# Patient Record
Sex: FEMALE | Race: WHITE | NOT HISPANIC OR LATINO | Employment: FULL TIME | URBAN - METROPOLITAN AREA
[De-identification: names, ages, dates, MRNs, and addresses within clinical notes are randomized per-mention and may not be internally consistent; named-entity substitution may affect disease eponyms.]

---

## 2018-06-22 PROBLEM — M26.629 TMJ SYNDROME: Status: ACTIVE | Noted: 2018-06-22

## 2021-05-06 ENCOUNTER — PATIENT MESSAGE (OUTPATIENT)
Dept: RESEARCH | Facility: HOSPITAL | Age: 66
End: 2021-05-06

## 2022-01-24 PROBLEM — E55.9 VITAMIN D DEFICIENCY: Status: ACTIVE | Noted: 2022-01-24

## 2022-01-24 PROBLEM — J30.1 SEASONAL ALLERGIC RHINITIS DUE TO POLLEN: Status: ACTIVE | Noted: 2022-01-24

## 2022-01-24 PROBLEM — I73.00 RAYNAUD'S DISEASE WITHOUT GANGRENE: Status: ACTIVE | Noted: 2022-01-24

## 2022-01-24 PROBLEM — M15.0 PRIMARY OSTEOARTHRITIS INVOLVING MULTIPLE JOINTS: Status: ACTIVE | Noted: 2022-01-24

## 2022-01-24 PROBLEM — M25.511 ACUTE PAIN OF RIGHT SHOULDER: Status: ACTIVE | Noted: 2022-01-24

## 2022-01-24 PROBLEM — M85.89 OSTEOPENIA OF MULTIPLE SITES: Status: ACTIVE | Noted: 2022-01-24

## 2022-01-24 PROBLEM — M15.9 PRIMARY OSTEOARTHRITIS INVOLVING MULTIPLE JOINTS: Status: ACTIVE | Noted: 2022-01-24

## 2022-05-10 ENCOUNTER — TELEPHONE (OUTPATIENT)
Dept: RHEUMATOLOGY | Facility: CLINIC | Age: 67
End: 2022-05-10

## 2022-05-10 NOTE — TELEPHONE ENCOUNTER
----- Message from Ronald Martinez sent at 5/9/2022  3:24 PM CDT -----  Regarding: appointment  Contact: self  Type:  Sooner Appointment Request    Caller is requesting a sooner appointment.  Caller declined first available appointment listed below.  Caller will not accept being placed on the waitlist and is requesting a message be sent to doctor.    Name of Caller:  self  When is the first available appointment?  na  Symptoms:    Best Call Back Number:  [phone]  Additional Information:  Patient requesting a new pt appointment.  .

## 2022-05-31 ENCOUNTER — PATIENT MESSAGE (OUTPATIENT)
Dept: RHEUMATOLOGY | Facility: CLINIC | Age: 67
End: 2022-05-31

## 2022-05-31 ENCOUNTER — TELEPHONE (OUTPATIENT)
Dept: RHEUMATOLOGY | Facility: CLINIC | Age: 67
End: 2022-05-31

## 2022-05-31 NOTE — TELEPHONE ENCOUNTER
----- Message from Meghan Hauser sent at 5/31/2022  4:42 PM CDT -----  Contact: [phone]  Type: Needs Medical Advice  Who Called:  Pt    Best Call Back Number: [phone]    Additional Information: Pt calling to get a New Pt appt. Pt states dr Berry's office said you were talking new pts. Pls call back and advise    All options to book in Rheumatology are sent through the portal. CG

## 2022-06-02 ENCOUNTER — PATIENT MESSAGE (OUTPATIENT)
Dept: RHEUMATOLOGY | Facility: CLINIC | Age: 67
End: 2022-06-02

## 2023-03-06 ENCOUNTER — OFFICE VISIT (OUTPATIENT)
Dept: RHEUMATOLOGY | Facility: CLINIC | Age: 68
End: 2023-03-06
Payer: MEDICARE

## 2023-03-06 VITALS
WEIGHT: 160.94 LBS | HEIGHT: 68 IN | SYSTOLIC BLOOD PRESSURE: 146 MMHG | HEART RATE: 72 BPM | BODY MASS INDEX: 24.39 KG/M2 | DIASTOLIC BLOOD PRESSURE: 81 MMHG

## 2023-03-06 DIAGNOSIS — M15.9 PRIMARY OSTEOARTHRITIS INVOLVING MULTIPLE JOINTS: ICD-10-CM

## 2023-03-06 DIAGNOSIS — M25.561 PAIN IN JOINT OF RIGHT KNEE: ICD-10-CM

## 2023-03-06 PROCEDURE — 99999 PR PBB SHADOW E&M-EST. PATIENT-LVL IV: ICD-10-PCS | Mod: PBBFAC,,, | Performed by: INTERNAL MEDICINE

## 2023-03-06 PROCEDURE — 99999 PR PBB SHADOW E&M-EST. PATIENT-LVL IV: CPT | Mod: PBBFAC,,, | Performed by: INTERNAL MEDICINE

## 2023-03-06 PROCEDURE — 99205 OFFICE O/P NEW HI 60 MIN: CPT | Mod: S$PBB,,, | Performed by: INTERNAL MEDICINE

## 2023-03-06 PROCEDURE — 99214 OFFICE O/P EST MOD 30 MIN: CPT | Mod: PBBFAC,PN | Performed by: INTERNAL MEDICINE

## 2023-03-06 PROCEDURE — 99205 PR OFFICE/OUTPT VISIT, NEW, LEVL V, 60-74 MIN: ICD-10-PCS | Mod: S$PBB,,, | Performed by: INTERNAL MEDICINE

## 2023-03-06 RX ORDER — MULTIVIT-MIN/FA/LYCOPEN/LUTEIN .4-300-25
TABLET ORAL
COMMUNITY

## 2023-03-06 NOTE — PROGRESS NOTES
Subjective:          Chief Complaint: Nicky Drew is a 67 y.o. female who had concerns including Disease Management.    HPI:    Patient is a 67-year-old female being referred by her primary care physician for the diagnosis of osteoarthritis.  No imaging but she seems to have multiple physical therapy visits involving her knees.  Patient has had OA since her 30s. First presented in cervical spine.   She has hx of arthroscopic surgery in both knees.  Pes planus, with ankle arthritis and sequela tendinopathies  Right hip more recent, some in groin, as well as lateral right hip and stiffness in low back.     She is very active, eats healthy, exercises regularly. She was concerned about autoimmune conditions. dsDNA/RNP antibody negative. She read an article about tetracycline and autoimmune disease.     Patient does appreciate stiffness in the morning, various joints. Takes a few hours this does involve the lumbar spine as well as various peripheral joints. No specific swelling, subtle swelling in various finger joints.     Tried:   NSAIDs-   Ibuprofen- only PRN and never daily for the stiffness.   Aleve- noted this to be beneficial  Tylenol she does use often, joints, less with muscular pain.       No hx of CAD or stenting  No strokes.     She does have Raynauds for many year she has been able to manage this. Was started on amlodipine last year not much different.   REVIEW OF SYSTEMS:    Review of Systems   Constitutional:  Negative for fever.   Eyes:  Positive for redness.   Respiratory:  Negative for cough and shortness of breath.    Cardiovascular:  Negative for chest pain.   Gastrointestinal:  Negative for constipation and diarrhea.   Genitourinary:  Negative for dysuria.   Skin:  Negative for rash.   Neurological:  Positive for headaches.   Endo/Heme/Allergies:  Does not bruise/bleed easily.             Objective:            Past Medical History:   Diagnosis Date    Arthritis     Basal cell carcinoma      Cataract     Genu valgum     left knee    History of shingles      Family History   Problem Relation Age of Onset    Stroke Mother     Alzheimer's disease Mother     Arthritis Mother     Miscarriages / Stillbirths Mother     Heart disease Father     Hypertension Father      Social History     Tobacco Use    Smoking status: Former     Packs/day: 0.25     Years: 10.00     Pack years: 2.50     Types: Cigarettes     Start date: 1973     Quit date: 1983     Years since quittin.2    Smokeless tobacco: Never   Substance Use Topics    Alcohol use: Yes     Alcohol/week: 7.0 standard drinks     Types: 7 Glasses of wine per week    Drug use: Never         Current Outpatient Medications on File Prior to Visit   Medication Sig Dispense Refill    amLODIPine (NORVASC) 5 MG tablet Take 1 tablet (5 mg total) by mouth once daily. 90 tablet 3    cetirizine (ZYRTEC) 10 MG tablet Take 1 tablet (10 mg total) by mouth once daily. 90 tablet 3    multivit-min-FA-lycopen-lutein (CENTRUM SILVER) 0.4 mg-300 mcg- 250 mcg Tab Take by mouth.      alendronate (FOSAMAX) 70 MG tablet Take 1 tablet (70 mg total) by mouth every 7 days. On Hold until 2023 (Patient not taking: Reported on 2/10/2023)       No current facility-administered medications on file prior to visit.       Vitals:    23 0900   BP: (!) 146/81   Pulse: 72       Physical Exam:    Physical Exam  Constitutional:       Appearance: She is well-developed.   HENT:      Head: Normocephalic and atraumatic.   Eyes:      Pupils: Pupils are equal, round, and reactive to light.   Cardiovascular:      Rate and Rhythm: Normal rate and regular rhythm.      Heart sounds: Normal heart sounds.   Pulmonary:      Effort: Pulmonary effort is normal.      Breath sounds: Normal breath sounds.   Musculoskeletal:      Right shoulder: No swelling or tenderness. Normal range of motion.      Left shoulder: No swelling or tenderness. Normal range of motion.      Right elbow: No  swelling. Normal range of motion. No tenderness.      Left elbow: No swelling. Normal range of motion. No tenderness.      Right wrist: No swelling or tenderness. Normal range of motion.      Left wrist: No swelling or tenderness. Normal range of motion.      Right hand: No swelling or tenderness. Normal range of motion.      Left hand: No swelling or tenderness. Normal range of motion.      Cervical back: Normal range of motion.      Right knee: No swelling. Normal range of motion. No tenderness.      Left knee: No swelling. Normal range of motion. No tenderness.      Right foot: Normal range of motion. No swelling or tenderness.      Left foot: Normal range of motion. No swelling or tenderness.   Skin:     General: Skin is warm and dry.   Neurological:      Mental Status: She is alert and oriented to person, place, and time.   Psychiatric:         Behavior: Behavior normal.             Assessment:       Encounter Diagnoses   Name Primary?    Primary osteoarthritis involving multiple joints     Pain in joint of right knee           Plan:        Primary osteoarthritis involving multiple joints  -     Ambulatory referral/consult to Rheumatology    Pain in joint of right knee  -     Ambulatory referral/consult to Rheumatology    Delightful 68 y/o female with Osteoarthritis here for discussion of progression, prognosis, treatment options, regarding OA. Discussed exercise, supplements, NSAID, tylenol and all other treatment modalities we can utilize to manage OA. There is no cure and no medication that will slow progression, but OA can be managed.     I do not feet this is inflammatory arthritis.   Follow up in about 8 months (around 11/6/2023).      60min consultation with greater than 50% of that time included Preparing to see the patient (review records, tests), Obtaining and/or reviewing separately obtained historical data, Performing a medically appropriate examination and/or evaluation , Ordering medications,  tests, and/or procedures, Referring and communicating with other healthcare professionals , Documenting clinical information in the electronic or other health record and Independently interpreting results  (as warranted) & communicating results to the patient/family/caregiver. All questions answered.  Thank you for allowing me to participate in the care of this very pleasant patient.

## 2023-10-10 ENCOUNTER — TELEPHONE (OUTPATIENT)
Dept: DERMATOLOGY | Facility: CLINIC | Age: 68
End: 2023-10-10
Payer: MEDICARE

## 2023-10-10 NOTE — TELEPHONE ENCOUNTER
----- Message from Krystina Zazueta sent at 10/10/2023  1:52 PM CDT -----  Regarding: apt  Contact: [phone]  Pt calling in as np to schedule apt for mole procedure  please call to discus Further      
Informed pt that we have not gotten a path report from her referring provider, Dr. Oliva. But once w get it, Dr. Louis will review and we will be contacting her soon after to schedule. Pt verbalized understanding.  
no

## 2023-10-11 ENCOUNTER — TELEPHONE (OUTPATIENT)
Dept: DERMATOLOGY | Facility: CLINIC | Age: 68
End: 2023-10-11
Payer: MEDICARE

## 2023-10-11 NOTE — TELEPHONE ENCOUNTER
----- Message from Ese Hewitt sent at 10/11/2023  4:43 PM CDT -----  Regarding: appt  Contact: [phone]  Pt returning call from staff in regards to scheduling. Pls call to discuss.

## 2023-10-11 NOTE — TELEPHONE ENCOUNTER
Np left voicemail for pt to call the office in regards to scheduling consult or same day sx for bx proven SCC right posterior proximal arm/shoulder per Dr. Oliva.

## 2023-10-11 NOTE — TELEPHONE ENCOUNTER
NP is now scheduled for same day mohs for SCC on R posterior proximal arm/shoulder. Referral from Dr. Oliva. Over the phone consult completed. Pt confirmed date, time, and location. New patient packet sent in the mail.

## 2023-11-06 ENCOUNTER — OFFICE VISIT (OUTPATIENT)
Dept: RHEUMATOLOGY | Facility: CLINIC | Age: 68
End: 2023-11-06
Payer: MEDICARE

## 2023-11-06 VITALS
HEART RATE: 79 BPM | HEIGHT: 68 IN | SYSTOLIC BLOOD PRESSURE: 135 MMHG | WEIGHT: 160.19 LBS | DIASTOLIC BLOOD PRESSURE: 86 MMHG | BODY MASS INDEX: 24.28 KG/M2

## 2023-11-06 DIAGNOSIS — I73.00 RAYNAUD'S DISEASE WITHOUT GANGRENE: ICD-10-CM

## 2023-11-06 DIAGNOSIS — M15.9 PRIMARY OSTEOARTHRITIS INVOLVING MULTIPLE JOINTS: Primary | ICD-10-CM

## 2023-11-06 PROCEDURE — 99999 PR PBB SHADOW E&M-EST. PATIENT-LVL III: ICD-10-PCS | Mod: PBBFAC,,, | Performed by: INTERNAL MEDICINE

## 2023-11-06 PROCEDURE — 99214 OFFICE O/P EST MOD 30 MIN: CPT | Mod: S$PBB,,, | Performed by: INTERNAL MEDICINE

## 2023-11-06 PROCEDURE — 99214 PR OFFICE/OUTPT VISIT, EST, LEVL IV, 30-39 MIN: ICD-10-PCS | Mod: S$PBB,,, | Performed by: INTERNAL MEDICINE

## 2023-11-06 PROCEDURE — 99999 PR PBB SHADOW E&M-EST. PATIENT-LVL III: CPT | Mod: PBBFAC,,, | Performed by: INTERNAL MEDICINE

## 2023-11-06 PROCEDURE — 99213 OFFICE O/P EST LOW 20 MIN: CPT | Mod: PBBFAC,PN | Performed by: INTERNAL MEDICINE

## 2023-11-06 RX ORDER — TURMERIC 400 MG
CAPSULE ORAL
COMMUNITY

## 2023-11-06 NOTE — PROGRESS NOTES
Subjective:          Chief Complaint: Nicky Drew is a 68 y.o. female who had concerns including Disease Management.    HPI:  Interval events  11/2023:  Patient is here for an 8 month follow-up at our consultation I did not find anything worrisome for an underlying connective tissue disease.  She does have Raynauds phenomenon given rx for amlodipine.   Since our last visit: right GTB and LBP received injection 2/2023 (Dr. Ny) did help greatly, repeat 8/2023 no benefit. She completed PT x 7 sessions with great benefit. Imaging of her hip done at [facility] was negative for clear hip pathology. She is noting the hip continues to give her pain at times through groin. Debate over MRI.     She is having a few days with low back where the stiffness was not improving with activity:   She did start Turmeric with 750mg daily and this did help with her joints. Attempted increasing 1500mg but GI upset. Tylenol arthritis helps some, Ibuprofen 400mg works best.     Rheum Hx:   Patient is a 67-year-old female being referred by her primary care physician for the diagnosis of osteoarthritis.  No imaging but she seems to have multiple physical therapy visits involving her knees.  Patient has had OA since her 30s. First presented in cervical spine.   She has hx of arthroscopic surgery in both knees.  Pes planus, with ankle arthritis and sequela tendinopathies  Right hip more recent, some in groin, as well as lateral right hip and stiffness in low back.     She is very active, eats healthy, exercises regularly. She was concerned about autoimmune conditions. dsDNA/RNP antibody negative. She read an article about tetracycline and autoimmune disease.     Patient does appreciate stiffness in the morning, various joints. Takes a few hours this does involve the lumbar spine as well as various peripheral joints. No specific swelling, subtle swelling in various finger joints.     Tried:   NSAIDs-   Ibuprofen- only PRN and never daily  for the stiffness.   Aleve- noted this to be beneficial  Tylenol she does use often, joints, less with muscular pain.       No hx of CAD or stenting  No strokes.     She does have Raynauds for many year she has been able to manage this. Was started on amlodipine last year not much different.   REVIEW OF SYSTEMS:    Review of Systems   Constitutional:  Negative for fever.   Eyes:  Positive for redness.   Respiratory:  Negative for cough and shortness of breath.    Cardiovascular:  Negative for chest pain.   Gastrointestinal:  Negative for constipation and diarrhea.   Genitourinary:  Negative for dysuria.   Skin:  Negative for rash.   Neurological:  Positive for headaches.   Endo/Heme/Allergies:  Does not bruise/bleed easily.               Objective:            Past Medical History:   Diagnosis Date    Arthritis     Basal cell carcinoma     Cataract     Genu valgum     left knee    History of shingles      Family History   Problem Relation Age of Onset    Stroke Mother     Alzheimer's disease Mother     Arthritis Mother     Miscarriages / Stillbirths Mother     Heart disease Father     Hypertension Father      Social History     Tobacco Use    Smoking status: Former     Current packs/day: 0.00     Average packs/day: 0.3 packs/day for 10.5 years (2.6 ttl pk-yrs)     Types: Cigarettes     Start date: 1973     Quit date: 1983     Years since quittin.9    Smokeless tobacco: Never   Substance Use Topics    Alcohol use: Yes     Alcohol/week: 7.0 standard drinks of alcohol     Types: 7 Glasses of wine per week    Drug use: Never         Current Outpatient Medications on File Prior to Visit   Medication Sig Dispense Refill    amLODIPine (NORVASC) 5 MG tablet Take 1 tablet (5 mg total) by mouth once daily. 90 tablet 3    cetirizine (ZYRTEC) 10 MG tablet Take 1 tablet (10 mg total) by mouth once daily. 90 tablet 3    multivit-min-FA-lycopen-lutein (CENTRUM SILVER) 0.4 mg-300 mcg- 250 mcg Tab Take by mouth.       turmeric 400 mg Cap Take by mouth.      alendronate (FOSAMAX) 70 MG tablet Take 1 tablet (70 mg total) by mouth every 7 days. On Hold until March 2023       No current facility-administered medications on file prior to visit.       Vitals:    11/06/23 1006   BP: 135/86   Pulse: 79       Physical Exam:    Physical Exam  Constitutional:       Appearance: She is well-developed.   HENT:      Head: Normocephalic and atraumatic.   Eyes:      Pupils: Pupils are equal, round, and reactive to light.   Cardiovascular:      Rate and Rhythm: Normal rate and regular rhythm.      Heart sounds: Normal heart sounds.   Pulmonary:      Effort: Pulmonary effort is normal.      Breath sounds: Normal breath sounds.   Musculoskeletal:      Right shoulder: No swelling or tenderness. Normal range of motion.      Left shoulder: No swelling or tenderness. Normal range of motion.      Right elbow: No swelling. Normal range of motion. No tenderness.      Left elbow: No swelling. Normal range of motion. No tenderness.      Right wrist: No swelling or tenderness. Normal range of motion.      Left wrist: No swelling or tenderness. Normal range of motion.      Right hand: No swelling or tenderness. Normal range of motion.      Left hand: No swelling or tenderness. Normal range of motion.      Cervical back: Normal range of motion.      Right knee: No swelling. Normal range of motion. No tenderness.      Left knee: No swelling. Normal range of motion. No tenderness.      Right foot: Normal range of motion. No swelling or tenderness.      Left foot: Normal range of motion. No swelling or tenderness.   Skin:     General: Skin is warm and dry.   Neurological:      Mental Status: She is alert and oriented to person, place, and time.   Psychiatric:         Behavior: Behavior normal.               Assessment:       No diagnosis found.         Plan:        Primary osteoarthritis involving multiple joints    Raynaud's disease without  gangrene      Delightful 68 y/o female with Osteoarthritis here for discussion of progression, prognosis, treatment options, regarding OA. Discussed exercise, supplements, NSAID, tylenol and all other treatment modalities we can utilize to manage OA. There is no cure and no medication that will slow progression, but OA can be managed.     I do not feet this is inflammatory arthritis. Findings continue to be all OA.   Follow up in about 1 year (around 11/6/2024).      30min consultation with greater than 50% of that time included Preparing to see the patient (review records, tests), Obtaining and/or reviewing separately obtained historical data, Performing a medically appropriate examination and/or evaluation , Ordering medications, tests, and/or procedures, Referring and communicating with other healthcare professionals , Documenting clinical information in the electronic or other health record and Independently interpreting results  (as warranted) & communicating results to the patient/family/caregiver. All questions answered.  Thank you for allowing me to participate in the care of this very pleasant patient.

## 2023-11-16 ENCOUNTER — PROCEDURE VISIT (OUTPATIENT)
Dept: DERMATOLOGY | Facility: CLINIC | Age: 68
End: 2023-11-16
Payer: MEDICARE

## 2023-11-16 VITALS
DIASTOLIC BLOOD PRESSURE: 84 MMHG | WEIGHT: 160.25 LBS | BODY MASS INDEX: 24.29 KG/M2 | HEIGHT: 68 IN | HEART RATE: 54 BPM | SYSTOLIC BLOOD PRESSURE: 150 MMHG

## 2023-11-16 DIAGNOSIS — C44.622 SQUAMOUS CELL CARCINOMA OF RIGHT UPPER EXTREMITY: Primary | ICD-10-CM

## 2023-11-16 PROCEDURE — 99499 UNLISTED E&M SERVICE: CPT | Mod: S$PBB,,, | Performed by: DERMATOLOGY

## 2023-11-16 PROCEDURE — 17313 MOHS 1 STAGE T/A/L: CPT | Mod: PBBFAC | Performed by: DERMATOLOGY

## 2023-11-16 PROCEDURE — 13121 PR RECMPL WND SCALP,EXTR 2.6-7.5 CM: ICD-10-PCS | Mod: S$PBB,51,, | Performed by: DERMATOLOGY

## 2023-11-16 PROCEDURE — 17313: ICD-10-PCS | Mod: S$PBB,,, | Performed by: DERMATOLOGY

## 2023-11-16 PROCEDURE — 17313 MOHS 1 STAGE T/A/L: CPT | Mod: S$PBB,,, | Performed by: DERMATOLOGY

## 2023-11-16 PROCEDURE — 13121 CMPLX RPR S/A/L 2.6-7.5 CM: CPT | Mod: S$PBB,51,, | Performed by: DERMATOLOGY

## 2023-11-16 PROCEDURE — 13121 CMPLX RPR S/A/L 2.6-7.5 CM: CPT | Mod: PBBFAC | Performed by: DERMATOLOGY

## 2023-11-16 PROCEDURE — 99499 NO LOS: ICD-10-PCS | Mod: S$PBB,,, | Performed by: DERMATOLOGY

## 2023-11-16 NOTE — PROGRESS NOTES
"New patient with a 6 months h/o growth om the R posterior proximal arm/shoulder. Patient states the lesion has been present since early summer. Denies bleeding, crusting, scabbing. Patient states lesion was "bubbly" and would "slough off". (+) painful to the touch. Biopsy c/w SCC. No prior treatment. (+) h/o NMSCs.    Physical Exam   Skin:              R posterior proximal arm/shoulder with a 9 x 10 mm pink bx site located 5 cm inferiorly from the top of the R shoulder.    Biopsy proven well-differentiated squamous cell carcinoma- R posterior proximal arm/shoulder, path# [id number].  Diagnosis, photograph, and pathology report were reviewed with patient.  Discussed risks, benefits, and alternatives of Mohs surgery.  Discussed repair options including complex closure, skin flap, skin graft, and second intention healing.  Patient agreed to proceed with Mohs surgery today.      PROCEDURE: Mohs' Micrographic Surgery    INDICATION: Tumor with ill-defined borders.    REFERRING PROVIDER: Geovany Oliva M.D.    CASE NUMBER:     ANESTHETIC: 3 cc 0.5% Lidocaine with Epi 1:200,000 mixed 1:1 with 0.5% Bupivacaine    SURGICAL PREP: Hibiclens    SURGEON: Annalee Louis MD    ASSISTANTS: Shavon Lino MA    PREOPERATIVE DIAGNOSIS: squamous cell carcinoma- well differentiated    POSTOPERATIVE DIAGNOSIS: squamous cell carcinoma    PATHOLOGIC DIAGNOSIS: squamous cell carcinoma- well differentiated    HISTOLOGY OF SPECIMENS IN FIRST STAGE:   Tumor Type:  No tumor seen.    STAGES OF MOHS' SURGERY PERFORMED: 1    TUMOR-FREE PLANE ACHIEVED: Yes    HEMOSTASIS: electrocoagulation     SPECIMENS: 2     LOCATION: right posterior proximal arm/shoulder. Location verified with Dr. Oliva's clinical photograph. Patient also verified location by looking at photo taken prior to procedure.     INITIAL LESION SIZE: 0.9 x 1.0 cm    FINAL DEFECT SIZE: 1.2 x 1.6 cm    WOUND REPAIR/DISPOSITION: The patient tolerated Mohs' Micrographic " Surgery for a squamous cell carcinoma very well. When the tumor was completely removed, a repair of the surgical defect was undertaken.    PROCEDURE: Complex Linear Repair    INDICATION: Status post Mohs' Micrographic Surgery for squamous cell carcinoma.    CASE NUMBER:     SURGEON: Annalee Louis MD    ASSISTANTS: Nolvia Shepard PA-C and Shavon Lino MA    ANESTHETIC: 2 cc 1% Lidocaine with Epinephrine 1:200,000    SURGICAL PREP: Hibiclens, prepped by Shavon Lino MA    LOCATION: right posterior proximal arm/shoulder    DEFECT SIZE: 1.2 x 1.6 cm    WOUND REPAIR/DISPOSITION:  After the patient's carcinoma had been completely removed with Mohs' Micrographic Surgery, a repair of the surgical defect was undertaken. The patient was returned to the operating suite where the area of right posterior proximal arm/shoulder was prepped, draped, and anesthetized in the usual sterile fashion. The wound was widely undermined in all directions. The wound was undermined to a distance at least the maximum width of the defect as measured perpendicular to the closure line along at least one entire edge of the defect, in this case 2 cm. Then, electrocoagulation was used to obtain meticulous hemostasis. 4-0 Vicryl buried vertical mattress sutures were placed into the subcutaneous and dermal plane to close the wound and stacey the cutaneous wound edge. Bilateral dog ears were identified and were removed by a standard Burow's triangle technique. The cutaneous wound edges were closed using running 4-0 Prolene suture.    The patient tolerated the procedure well.    The area was cleaned and dressed appropriately and the patient was given wound care instructions, as well as appointment for follow-up evaluation and suture removal in 14 days.    LENGTH OF REPAIR: 2.8 cm    Vitals:    11/16/23 1301 11/16/23 1442   BP: (!) 143/84 (!) 150/84   BP Location: Left arm    Patient Position: Sitting    BP Method: Medium (Automatic)    Pulse: 65  "(!) 54   Weight: 72.7 kg (160 lb 4.4 oz)    Height: 5' 7.99" (1.727 m)          "

## 2023-11-30 ENCOUNTER — OFFICE VISIT (OUTPATIENT)
Dept: DERMATOLOGY | Facility: CLINIC | Age: 68
End: 2023-11-30
Payer: MEDICARE

## 2023-11-30 DIAGNOSIS — Z09 POSTOP CHECK: Primary | ICD-10-CM

## 2023-11-30 PROCEDURE — 99024 PR POST-OP FOLLOW-UP VISIT: ICD-10-PCS | Mod: POP,,, | Performed by: DERMATOLOGY

## 2023-11-30 PROCEDURE — 99212 OFFICE O/P EST SF 10 MIN: CPT | Mod: PBBFAC | Performed by: DERMATOLOGY

## 2023-11-30 PROCEDURE — 99024 POSTOP FOLLOW-UP VISIT: CPT | Mod: POP,,, | Performed by: DERMATOLOGY

## 2023-11-30 PROCEDURE — 99999 PR PBB SHADOW E&M-EST. PATIENT-LVL II: ICD-10-PCS | Mod: PBBFAC,,, | Performed by: DERMATOLOGY

## 2023-11-30 PROCEDURE — 99999 PR PBB SHADOW E&M-EST. PATIENT-LVL II: CPT | Mod: PBBFAC,,, | Performed by: DERMATOLOGY

## 2023-11-30 NOTE — PROGRESS NOTES
68 y.o. female patient is here for suture removal following Mohs' surgery.    Patient reports no problems.    WOUND PE:  The right posterior proximal arm/shoulder sutures intact. Wound healing well. Good skin edges. No signs or symptoms of infection.      IMPRESSION:  Healing operative site from Mohs' surgery SCC right posterior proximal arm/shoulder s/p Mohs with CLC, postop day #14.    PLAN:  Sutures removed today by  Kylah Chiu RN . Steri-strips applied.  Continue wound care.  Keep moist with Aquaphor.  Call if any issues arise    RTC:  In 3-6 months with Geovany Oliva M.D. for skin check or sooner if new concern arises.

## 2024-02-09 PROBLEM — I10 ESSENTIAL HYPERTENSION: Status: ACTIVE | Noted: 2024-02-09

## 2024-03-20 PROBLEM — Z91.89 FRAMINGHAM CARDIAC RISK 10-20% IN NEXT 10 YEARS: Status: ACTIVE | Noted: 2024-03-20

## 2024-08-23 ENCOUNTER — TELEPHONE (OUTPATIENT)
Dept: RHEUMATOLOGY | Facility: CLINIC | Age: 69
End: 2024-08-23
Payer: MEDICARE

## 2024-08-23 NOTE — TELEPHONE ENCOUNTER
----- Message from Trudy Barry sent at 8/23/2024 12:36 PM CDT -----  Contact: Self  Type: Needs Medical Advice    Who Called:  Patient  What is this regarding?:  She was looking to schedule a f/up appt out in November.  Best Call Back Number:  [phone]  Additional Information:  Please call the patient back at the phone number listed above to advise. Thank you!

## 2024-11-18 ENCOUNTER — OFFICE VISIT (OUTPATIENT)
Dept: RHEUMATOLOGY | Facility: CLINIC | Age: 69
End: 2024-11-18
Payer: MEDICARE

## 2024-11-18 ENCOUNTER — LAB VISIT (OUTPATIENT)
Dept: LAB | Facility: HOSPITAL | Age: 69
End: 2024-11-18
Attending: INTERNAL MEDICINE
Payer: MEDICARE

## 2024-11-18 VITALS
HEIGHT: 68 IN | BODY MASS INDEX: 24.99 KG/M2 | HEART RATE: 73 BPM | WEIGHT: 164.88 LBS | DIASTOLIC BLOOD PRESSURE: 82 MMHG | SYSTOLIC BLOOD PRESSURE: 123 MMHG

## 2024-11-18 DIAGNOSIS — M15.0 PRIMARY OSTEOARTHRITIS INVOLVING MULTIPLE JOINTS: Primary | ICD-10-CM

## 2024-11-18 DIAGNOSIS — M15.0 PRIMARY OSTEOARTHRITIS INVOLVING MULTIPLE JOINTS: ICD-10-CM

## 2024-11-18 DIAGNOSIS — I73.00 RAYNAUD'S DISEASE WITHOUT GANGRENE: ICD-10-CM

## 2024-11-18 LAB
CREAT SERPL-MCNC: 0.7 MG/DL (ref 0.5–1.4)
EST. GFR  (NO RACE VARIABLE): >60 ML/MIN/1.73 M^2
RHEUMATOID FACT SERPL-ACNC: <13 IU/ML (ref 0–15)

## 2024-11-18 PROCEDURE — 86256 FLUORESCENT ANTIBODY TITER: CPT | Performed by: INTERNAL MEDICINE

## 2024-11-18 PROCEDURE — 86235 NUCLEAR ANTIGEN ANTIBODY: CPT | Performed by: INTERNAL MEDICINE

## 2024-11-18 PROCEDURE — 86038 ANTINUCLEAR ANTIBODIES: CPT | Performed by: INTERNAL MEDICINE

## 2024-11-18 PROCEDURE — 99214 OFFICE O/P EST MOD 30 MIN: CPT | Mod: S$PBB,,, | Performed by: INTERNAL MEDICINE

## 2024-11-18 PROCEDURE — 86235 NUCLEAR ANTIGEN ANTIBODY: CPT | Mod: 59 | Performed by: INTERNAL MEDICINE

## 2024-11-18 PROCEDURE — 36415 COLL VENOUS BLD VENIPUNCTURE: CPT | Mod: PO | Performed by: INTERNAL MEDICINE

## 2024-11-18 PROCEDURE — 99213 OFFICE O/P EST LOW 20 MIN: CPT | Mod: PBBFAC,PO | Performed by: INTERNAL MEDICINE

## 2024-11-18 PROCEDURE — 99999 PR PBB SHADOW E&M-EST. PATIENT-LVL III: CPT | Mod: PBBFAC,,, | Performed by: INTERNAL MEDICINE

## 2024-11-18 PROCEDURE — 82565 ASSAY OF CREATININE: CPT | Performed by: INTERNAL MEDICINE

## 2024-11-18 PROCEDURE — 86431 RHEUMATOID FACTOR QUANT: CPT | Performed by: INTERNAL MEDICINE

## 2024-11-18 ASSESSMENT — ROUTINE ASSESSMENT OF PATIENT INDEX DATA (RAPID3)
PAIN SCORE: 2
PSYCHOLOGICAL DISTRESS SCORE: 0
MDHAQ FUNCTION SCORE: 0.2
TOTAL RAPID3 SCORE: 1.56
PATIENT GLOBAL ASSESSMENT SCORE: 2
FATIGUE SCORE: 1.1

## 2024-11-18 NOTE — PROGRESS NOTES
Subjective:          Chief Complaint: Nicky Drew is a 69 y.o. female who had concerns including Disease Management.    HPI: OA and Primary Raynuads.   Interval events  11/2024: started new exercise: Access strength targeted for osteoporosis.   She is noting increase in thoracic stiffness using advil about 3 times weekly  Known ankle tendonitits with pes planus the left flared since 8/2024.   Knees aching but managing.   Hip is right GTB. Some antalgic gait. Noted.     Overall she notes her Raynaud's stable     11/2023:  Patient is here for an 8 month follow-up at our consultation I did not find anything worrisome for an underlying connective tissue disease.  She does have Raynauds phenomenon given rx for amlodipine.   Since our last visit: right GTB and LBP received injection 2/2023 (Dr. Ny) did help greatly, repeat 8/2023 no benefit. She completed PT x 7 sessions with great benefit. [place] of [place] [facility] [place] for clear hip pathology. She is noting the hip continues to give her pain at times through groin. Debate over MRI.     She is having a few days with low back where the stiffness was not improving with activity:   She did start Turmeric with 750mg daily and this did help with her joints. Attempted increasing 1500mg but GI upset. Tylenol arthritis helps some, Ibuprofen 400mg works best.     Rheum Hx:   Patient is a 67-year-old female being referred by her primary care physician for the diagnosis of osteoarthritis.  No imaging but she seems to have multiple physical therapy visits involving her knees.  Patient has had OA since her 30s. First presented in cervical spine.   She has hx of arthroscopic surgery in both knees.  Pes planus, with ankle arthritis and sequela tendinopathies  Right hip more recent, some in groin, as well as lateral right hip and stiffness in low back.     She is very active, eats healthy, exercises regularly. She was concerned about autoimmune conditions.  dsDNA/RNP antibody negative. She read an article about tetracycline and autoimmune disease.     Patient does appreciate stiffness in the morning, various joints. Takes a few hours this does involve the lumbar spine as well as various peripheral joints. No specific swelling, subtle swelling in various finger joints.     Tried:   NSAIDs-   Ibuprofen- only PRN and never daily for the stiffness.   Aleve- noted this to be beneficial  Tylenol she does use often, joints, less with muscular pain.       No hx of CAD or stenting  No strokes.     She does have Raynauds for many year she has been able to manage this. Was started on amlodipine last year not much different.   REVIEW OF SYSTEMS:    Review of Systems   Constitutional:  Negative for fever.   Eyes:  Positive for redness.   Respiratory:  Negative for cough and shortness of breath.    Cardiovascular:  Negative for chest pain.   Gastrointestinal:  Negative for constipation and diarrhea.   Genitourinary:  Negative for dysuria.   Skin:  Negative for rash.   Neurological:  Positive for headaches.   Endo/Heme/Allergies:  Does not bruise/bleed easily.               Objective:            Past Medical History:   Diagnosis Date    Arthritis     Basal cell carcinoma     Cataract     Genu valgum     left knee    History of shingles     SCC (squamous cell carcinoma), shoulder 11/16/2023    right posterior proximal arm/shoulder     Family History   Problem Relation Name Age of Onset    Stroke Mother Nisha L Viki     Alzheimer's disease Mother Nisha WINTER Viki     Arthritis Mother Nisha L Viki     Miscarriages / Stillbirths Mother Nisha L Viki     Heart disease Father Wander LIRIANO Viki     Hypertension Father Wander LIRIANO Viki      Social History     Tobacco Use    Smoking status: Former     Current packs/day: 0.00     Average packs/day: 0.3 packs/day for 10.5 years (2.6 ttl pk-yrs)     Types: Cigarettes     Start date: 6/4/1973     Quit date: 12/1/1983     Years since  quittin.9    Smokeless tobacco: Never   Substance Use Topics    Alcohol use: Yes     Alcohol/week: 7.0 standard drinks of alcohol     Types: 7 Glasses of wine per week    Drug use: Never         Current Outpatient Medications on File Prior to Visit   Medication Sig Dispense Refill    amLODIPine (NORVASC) 5 MG tablet Take 1 tablet (5 mg total) by mouth once daily. 90 tablet 3    cetirizine (ZYRTEC) 10 MG tablet Take 1 tablet (10 mg total) by mouth once daily. 90 tablet 3    multivit-min-FA-lycopen-lutein (CENTRUM SILVER) 0.4 mg-300 mcg- 250 mcg Tab Take by mouth.      turmeric 400 mg Cap Take by mouth.       No current facility-administered medications on file prior to visit.       Vitals:    24 1331   BP: 123/82   Pulse: 73       Physical Exam:    Physical Exam  Constitutional:       Appearance: She is well-developed.   HENT:      Head: Normocephalic and atraumatic.   Eyes:      Pupils: Pupils are equal, round, and reactive to light.   Cardiovascular:      Rate and Rhythm: Normal rate and regular rhythm.      Heart sounds: Normal heart sounds.   Pulmonary:      Effort: Pulmonary effort is normal.      Breath sounds: Normal breath sounds.   Musculoskeletal:      Right shoulder: No swelling or tenderness. Normal range of motion.      Left shoulder: No swelling or tenderness. Normal range of motion.      Right elbow: No swelling. Normal range of motion. No tenderness.      Left elbow: No swelling. Normal range of motion. No tenderness.      Right wrist: No swelling or tenderness. Normal range of motion.      Left wrist: No swelling or tenderness. Normal range of motion.      Right hand: No swelling or tenderness. Normal range of motion.      Left hand: No swelling or tenderness. Normal range of motion.      Cervical back: Normal range of motion.      Right knee: No swelling. Normal range of motion. No tenderness.      Left knee: No swelling. Normal range of motion. No tenderness.      Right foot: Normal  range of motion. No swelling or tenderness.      Left foot: Normal range of motion. No swelling or tenderness.   Skin:     General: Skin is warm and dry.   Neurological:      Mental Status: She is alert and oriented to person, place, and time.   Psychiatric:         Behavior: Behavior normal.               Assessment:       Encounter Diagnoses   Name Primary?    Primary osteoarthritis involving multiple joints Yes    Raynaud's disease without gangrene             Plan:        Primary osteoarthritis involving multiple joints  -     CREATININE, SERUM; Future; Expected date: 11/18/2024    Raynaud's disease without gangrene  -     KENDRA Screen w/Reflex; Future; Expected date: 11/18/2024  -     Sjogrens syndrome-A extractable nuclear antibody; Future; Expected date: 11/18/2024  -     Sjogrens syndrome-B extractable nuclear antibody; Future; Expected date: 11/18/2024  -     RHEUMATOID FACTOR; Future; Expected date: 11/18/2024  -     Anti-Centromere Antibody; Future; Expected date: 11/18/2024  -     ANTI-SCLERODERMA ANTIBODY; Future; Expected date: 11/18/2024  -     RNA polymerase III Ab, IgG; Future; Expected date: 11/18/2024        Delightful 70 y/o female with Osteoarthritis here for discussion of progression, prognosis, treatment options, regarding OA. Discussed exercise, supplements, NSAID, tylenol and all other treatment modalities we can utilize to manage OA. There is no cure and no medication that will slow progression, but OA can be managed.     I do not feet this is inflammatory arthritis. Findings continue to be all OA. We are going to expand her serologies for completeness- no sclerodactyly, no telangectasia, no calcinosis or dysphagia.     No follow-ups on file.      30min consultation with greater than 50% of that time included Preparing to see the patient (review records, tests), Obtaining and/or reviewing separately obtained historical data, Performing a medically appropriate examination and/or evaluation ,  Ordering medications, tests, and/or procedures, Referring and communicating with other healthcare professionals , Documenting clinical information in the electronic or other health record and Independently interpreting results  (as warranted) & communicating results to the patient/family/caregiver. All questions answered.  Thank you for allowing me to participate in the care of this very pleasant patient.

## 2024-11-19 LAB — ANA SER QL IF: NORMAL

## 2024-11-20 LAB
ANTI-SSA ANTIBODY: 0.06 RATIO (ref 0–0.99)
ANTI-SSA INTERPRETATION: NEGATIVE
ANTI-SSB ANTIBODY: 0.08 RATIO (ref 0–0.99)
ANTI-SSB INTERPRETATION: NEGATIVE

## 2024-11-21 LAB
ANTI-CENTROMERE ANTIBODY: 0.6 U/ML
ENA SCL70 AB SER-ACNC: <0.6 U/ML

## 2025-03-03 ENCOUNTER — PATIENT MESSAGE (OUTPATIENT)
Dept: OTHER | Facility: OTHER | Age: 70
End: 2025-03-03
Payer: MEDICARE

## 2025-05-05 ENCOUNTER — PATIENT MESSAGE (OUTPATIENT)
Dept: ADMINISTRATIVE | Facility: OTHER | Age: 70
End: 2025-05-05
Payer: MEDICARE

## 2025-07-14 ENCOUNTER — OFFICE VISIT (OUTPATIENT)
Dept: RHEUMATOLOGY | Facility: CLINIC | Age: 70
End: 2025-07-14
Payer: MEDICARE

## 2025-07-14 VITALS
SYSTOLIC BLOOD PRESSURE: 126 MMHG | HEIGHT: 68 IN | HEART RATE: 69 BPM | WEIGHT: 162.06 LBS | DIASTOLIC BLOOD PRESSURE: 76 MMHG | BODY MASS INDEX: 24.56 KG/M2

## 2025-07-14 DIAGNOSIS — I73.00 RAYNAUD'S DISEASE WITHOUT GANGRENE: Primary | ICD-10-CM

## 2025-07-14 DIAGNOSIS — M15.0 PRIMARY OSTEOARTHRITIS INVOLVING MULTIPLE JOINTS: ICD-10-CM

## 2025-07-14 PROCEDURE — 99214 OFFICE O/P EST MOD 30 MIN: CPT | Mod: S$PBB,,, | Performed by: INTERNAL MEDICINE

## 2025-07-14 PROCEDURE — 99999 PR PBB SHADOW E&M-EST. PATIENT-LVL III: CPT | Mod: PBBFAC,,, | Performed by: INTERNAL MEDICINE

## 2025-07-14 PROCEDURE — 99213 OFFICE O/P EST LOW 20 MIN: CPT | Mod: PBBFAC,PO | Performed by: INTERNAL MEDICINE

## 2025-07-14 NOTE — PROGRESS NOTES
Subjective:          Chief Complaint: Nicky Drew is a 69 y.o. female who had concerns including Disease Management.    HPI: OA and Primary Raynuads.   Interval events    7/2025: today 3/10 pain left ankle/foot with PTT since about April working with ortho on this.     Wanted to discuss some flares- she had a party at her house, decorating, baking, and ate 2 slices of cake. She felt heavy and felt like walking through molasses. No specific joint pain. Energy was down. O/w no cough, sob, skin changes, telangectasia.   Raynaud has been good.   We follow her for Raynauds but thus far negative serologies.     11/2024: started new exercise: Access strength targeted for osteoporosis.   She is noting increase in thoracic stiffness using advil about 3 times weekly  Known ankle tendonitits with pes planus the left flared since 8/2024.   Knees aching but managing.   Hip is right GTB. Some antalgic gait. Noted.     Overall she notes her Raynaud's stable     11/2023:  Patient is here for an 8 month follow-up at our consultation I did not find anything worrisome for an underlying connective tissue disease.  She does have Raynauds phenomenon given rx for amlodipine.   Since our last visit: right GTB and LBP received injection 2/2023 (Dr. Ny) did help greatly, repeat 8/2023 no benefit. She completed PT x 7 sessions with great benefit. [facility] negative for clear hip pathology. She is noting the hip continues to give her pain at times through groin. Debate over MRI.     She is having a few days with low back where the stiffness was not improving with activity:   She did start Turmeric with 750mg daily and this did help with her joints. Attempted increasing 1500mg but GI upset. Tylenol arthritis helps some, Ibuprofen 400mg works best.     Rheum Hx:   Patient is a 67-year-old female being referred by her primary care physician for the diagnosis of osteoarthritis.  No imaging but she seems to  have multiple physical therapy visits involving her knees.  Patient has had OA since her 30s. First presented in cervical spine.   She has hx of arthroscopic surgery in both knees.  Pes planus, with ankle arthritis and sequela tendinopathies  Right hip more recent, some in groin, as well as lateral right hip and stiffness in low back.     She is very active, eats healthy, exercises regularly. She was concerned about autoimmune conditions. dsDNA/RNP antibody negative. She read an article about tetracycline and autoimmune disease.     Patient does appreciate stiffness in the morning, various joints. Takes a few hours this does involve the lumbar spine as well as various peripheral joints. No specific swelling, subtle swelling in various finger joints.     Tried:   NSAIDs-   Ibuprofen- only PRN and never daily for the stiffness.   Aleve- noted this to be beneficial  Tylenol she does use often, joints, less with muscular pain.       No hx of CAD or stenting  No strokes.     She does have Raynauds for many year she has been able to manage this. Was started on amlodipine last year not much different.   REVIEW OF SYSTEMS:    Review of Systems   Constitutional:  Negative for fever.   Eyes:  Positive for redness.   Respiratory:  Negative for cough and shortness of breath.    Cardiovascular:  Negative for chest pain.   Gastrointestinal:  Negative for constipation and diarrhea.   Genitourinary:  Negative for dysuria.   Skin:  Negative for rash.   Neurological:  Positive for headaches.   Endo/Heme/Allergies:  Does not bruise/bleed easily.               Objective:            Past Medical History:   Diagnosis Date    Arthritis     Basal cell carcinoma     Cataract     Genu valgum     left knee    History of shingles     SCC (squamous cell carcinoma), shoulder 11/16/2023    right posterior proximal arm/shoulder     Family History   Problem Relation Name Age of Onset    Stroke Mother Nisha Drew     Alzheimer's disease Mother  Nisha Drew     Arthritis Mother Nisha Drew     Miscarriages / Stillbirths Mother Nisha Drew     Heart disease Father Wander Drew     Hypertension Father Wander Drew      Social History     Tobacco Use    Smoking status: Former     Current packs/day: 0.00     Average packs/day: 0.3 packs/day for 10.5 years (2.6 ttl pk-yrs)     Types: Cigarettes     Start date: 1973     Quit date: 1983     Years since quittin.6    Smokeless tobacco: Never   Substance Use Topics    Alcohol use: Yes     Alcohol/week: 7.0 standard drinks of alcohol     Types: 7 Glasses of wine per week    Drug use: Never         Current Outpatient Medications on File Prior to Visit   Medication Sig Dispense Refill    amLODIPine (NORVASC) 5 MG tablet Take 1 tablet (5 mg total) by mouth once daily. 90 tablet 3    cetirizine (ZYRTEC) 10 MG tablet Take 1 tablet (10 mg total) by mouth once daily. 90 tablet 3    multivit-min-FA-lycopen-lutein (CENTRUM SILVER) 0.4 mg-300 mcg- 250 mcg Tab Take by mouth.      turmeric 400 mg Cap Take by mouth.       No current facility-administered medications on file prior to visit.       Vitals:    25 1429   BP: 126/76   Pulse: 69       Physical Exam:    Physical Exam  Constitutional:       Appearance: She is well-developed.   HENT:      Head: Normocephalic and atraumatic.   Eyes:      Pupils: Pupils are equal, round, and reactive to light.   Cardiovascular:      Rate and Rhythm: Normal rate and regular rhythm.      Heart sounds: Normal heart sounds.   Pulmonary:      Effort: Pulmonary effort is normal.      Breath sounds: Normal breath sounds.   Musculoskeletal:      Right shoulder: No swelling or tenderness. Normal range of motion.      Left shoulder: No swelling or tenderness. Normal range of motion.      Right elbow: No swelling. Normal range of motion. No tenderness.      Left elbow: No swelling. Normal range of motion. No tenderness.      Right wrist: No swelling or  tenderness. Normal range of motion.      Left wrist: No swelling or tenderness. Normal range of motion.      Right hand: No swelling or tenderness. Normal range of motion.      Left hand: No swelling or tenderness. Normal range of motion.      Cervical back: Normal range of motion.      Right knee: No swelling. Normal range of motion. No tenderness.      Left knee: No swelling. Normal range of motion. No tenderness.      Right foot: Normal range of motion. No swelling or tenderness.      Left foot: Normal range of motion. No swelling or tenderness.   Skin:     General: Skin is warm and dry.   Neurological:      Mental Status: She is alert and oriented to person, place, and time.   Psychiatric:         Behavior: Behavior normal.               Assessment:       Encounter Diagnoses   Name Primary?    Raynaud's disease without gangrene Yes    Primary osteoarthritis involving multiple joints          Plan:        Raynaud's disease without gangrene    Primary osteoarthritis involving multiple joints      Delightful 68 y/o female with Osteoarthritis here for discussion of progression, prognosis, treatment options, regarding OA. Discussed exercise, supplements, NSAID, tylenol and all other treatment modalities we can utilize to manage OA. There is no cure and no medication that will slow progression, but OA can be managed.     I do not feet this is inflammatory arthritis. Findings continue to be all OA. We are going to expand her serologies for completeness- no sclerodactyly, no telangectasia, no calcinosis or dysphagia.     Follow up in about 8 months (around 3/14/2026).      30min consultation with greater than 50% of that time included Preparing to see the patient (review records, tests), Obtaining and/or reviewing separately obtained historical data, Performing a medically appropriate examination and/or evaluation , Ordering medications, tests, and/or procedures, Referring and communicating with other healthcare  professionals , Documenting clinical information in the electronic or other health record and Independently interpreting results  (as warranted) & communicating results to the patient/family/caregiver. All questions answered.  Thank you for allowing me to participate in the care of this very pleasant patient.